# Patient Record
Sex: MALE | Race: WHITE | HISPANIC OR LATINO | Employment: FULL TIME | URBAN - METROPOLITAN AREA
[De-identification: names, ages, dates, MRNs, and addresses within clinical notes are randomized per-mention and may not be internally consistent; named-entity substitution may affect disease eponyms.]

---

## 2017-01-20 ENCOUNTER — APPOINTMENT (OUTPATIENT)
Dept: LAB | Age: 57
End: 2017-01-20
Attending: PREVENTIVE MEDICINE

## 2017-01-20 ENCOUNTER — HOSPITAL ENCOUNTER (OUTPATIENT)
Dept: RADIOLOGY | Age: 57
Discharge: HOME | End: 2017-01-20
Attending: PREVENTIVE MEDICINE

## 2017-01-20 ENCOUNTER — TRANSCRIBE ORDERS (OUTPATIENT)
Dept: URGENT CARE | Age: 57
End: 2017-01-20

## 2017-01-20 DIAGNOSIS — Z00.00 ROUTINE GENERAL MEDICAL EXAMINATION AT A HEALTH CARE FACILITY: Primary | ICD-10-CM

## 2017-01-20 DIAGNOSIS — Z00.00 ROUTINE GENERAL MEDICAL EXAMINATION AT A HEALTH CARE FACILITY: ICD-10-CM

## 2017-01-20 PROCEDURE — 71020 HB CHEST X-RAY 2VW FRONTAL&LATL: CPT

## 2017-01-20 PROCEDURE — 86870 RBC ANTIBODY IDENTIFICATION: CPT

## 2017-01-20 PROCEDURE — 84202 ASSAY RBC PROTOPORPHYRIN: CPT

## 2017-01-20 PROCEDURE — 36415 COLL VENOUS BLD VENIPUNCTURE: CPT

## 2017-01-25 LAB
FEP BLD-MCNC: 32 UG/DL (ref 0–100)
LEAD BLD-MCNC: 2 UG/DL (ref 0–19)
ZPP RBC-MCNC: 35 UG/DL (ref 0–100)

## 2017-12-19 ENCOUNTER — ALLSCRIPTS OFFICE VISIT (OUTPATIENT)
Dept: OTHER | Facility: OTHER | Age: 57
End: 2017-12-19

## 2017-12-20 ENCOUNTER — TRANSCRIBE ORDERS (OUTPATIENT)
Dept: ADMINISTRATIVE | Facility: HOSPITAL | Age: 57
End: 2017-12-20

## 2017-12-20 DIAGNOSIS — I44.7 LEFT BUNDLE-BRANCH BLOCK: ICD-10-CM

## 2017-12-20 DIAGNOSIS — Z00.00 ENCOUNTER FOR GENERAL ADULT MEDICAL EXAMINATION WITHOUT ABNORMAL FINDINGS: ICD-10-CM

## 2017-12-20 DIAGNOSIS — I10 ESSENTIAL HYPERTENSION, MALIGNANT: Primary | ICD-10-CM

## 2017-12-20 DIAGNOSIS — E78.00 PURE HYPERCHOLESTEROLEMIA: ICD-10-CM

## 2017-12-20 DIAGNOSIS — I10 ESSENTIAL (PRIMARY) HYPERTENSION: ICD-10-CM

## 2017-12-20 DIAGNOSIS — E78.5 HYPERLIPIDEMIA: ICD-10-CM

## 2017-12-20 NOTE — PROGRESS NOTES
Assessment  Assessed    1  Dyslipidemia (272 4) (E78 5)   2  Hypertension (401 9) (I10)   3  Left bundle branch block (426 3) (I44 7)    Plan  Health Maintenance    · Follow-up visit in 1 year Evaluation and Treatment  Follow-up  Status: Hold For -Scheduling  Requested for: 40DLF2448   Ordered; For: Health Maintenance; Ordered By: Kristin Ochoa Performed:  Due: 36QWP4598   · (1) [place]; Status:Active; Requested for:91Uhw8834;    Perform:[facility]; Due:23Wwa0455; Ordered;For:Health Maintenance; Ordered By:Yasmani Vargas;   · (1) LIPID PANEL FASTING W DIRECT LDL REFLEX; Status:Active; Requestedfor:[id number];    Perform:[facility]; Due:40Rhg5418; Ordered;For:Health Maintenance; Ordered By:Daniel Vargas; Hypercholesterolemia, Hyperlipidemia, Hypertension    · (1) COMPREHENSIVE METABOLIC PANEL; Status:Active - Retrospective By ProtocolAuthorization; Requested for:[id number];    Perform:[facility]; Order Comments:non fasting; Due:56Wfb4226; Last Updated By:Dacia Dwyer; 12/19/2017 5:36:24 PM;Ordered;For:Hypercholesterolemia, Hyperlipidemia, Hypertension; Ordered By:Daniel Vargas; Hypercholesterolemia, Hypertension, Left bundle branch block    · (1) CBC/ PLT (NO DIFF); Status:Active - Retrospective By Protocol Authorization; Requested for:[id number];    Perform:[address]; Order Comments:non fasting; Due:66Udg6728; Last Updated By:Dacia Dwyer; 12/19/2017 5:36:23 PM;Ordered;For:Hypercholesterolemia, Hypertension, Left bundle branch block; Ordered By:Daniel Vargas; Hyperlipidemia    · Simvastatin 40 MG Oral Tablet; Take one tablet daily   Rx By: Kristin Ochoa; Dispense: 90 Days ; #:90 X 90 Tablet Bottle;  Refill: 3;For: Hyperlipidemia; MAZIN = Y; Verified Transmission to [facility] ; Last Updated By: System, [employer]; 12/19/2017 5:50:14 PM  Hypertension    · AmLODIPine Besylate 10 MG Oral Tablet; take one tablet by mouth daily   Rx By: Kristin Ochoa; Dispense: 90 Days ; #:90 X 90 Tablet Bottle; Refill: 3;For: Hypertension; MAZIN = N; Verified Transmission to [address]; Last Updated By: System, [employer]; 12/19/2017 5:50:14 PM   · Labetalol HCl - 200 MG Oral Tablet; Take one tablet twice daily   Rx By: Kate Lu; Dispense: 90 Days ; #:180 X 90 Tablet Bottle; Refill: 3;For: Hypertension; MAZIN = N; Verified Transmission to [address]; Last Updated By: System, [employer]; 12/19/2017 5:50:14 PM  Left bundle branch block    · ECHO COMPLETE WITH CONTRAST IF INDICATED; Status:Need Information - FinancialAuthorization; Requested for:[id number];    Perform:[facility]; Due:35Jun7120; Ordered; For:Left bundle branch block; Ordered By:Sonal Vargas;   · EKG/ECG- POC; Status:Complete;   Done: 64OUN2398   Perform: In Office; Due:44Xlx5078; Last Updated Jen Pederson; 12/19/2017 4:53:40 PM;Ordered; For:Left bundle branch block; Ordered By:Yasmani Vargas;    Discussion/Summary  He has no symptoms  He does very vigorous work  He has been off medications for a number of weeks  EKG continues to exhibit left bundle with a wide QRS, noted at the time of his prior exam in 8/15, not present in 6/14  Because of his extensive traveling, he is been unable to undergo testing  He plans to remain in [facility] for the next 1-2 weeks  We will make every attempt to obtain a lipid panel and an echocardiogram in the coming days  He will return in one year  Chief Complaint  Chief Complaint Free Text Note Form: Patient is here for a followup  Patient denies cardiac complaints  History of Present Illness  Mr Berlin Galindo has a history of hypertension and dyslipidemia  His lipids are well controlled on statin therapy  His blood pressure is under reasonable control  A new left bundle branch block, with wide QRS (160 ms) was identified at the time of his visit in 8/15; this was not present on the tracing obtained in 6/14   An echocardiogram in 2008 disclosed normal systolic function  He has remained completely without symptoms, including dyspnea and chest discomfort, in spite of a very physically demanding occupation  Unfortunately, because of his travel schedule, appropriate testing and follow-up has not been possible  At the time of his visit in [id number], he was able to remain in the area for several days, and arrangements were made for lipid testing and an echocardiogram to assess the heart for structural abnormalities in the presence of a persistent new left bundle branch block  He is an expert in demolition, and is frequently out of the area  Arrangements were made for an evaluation to conform to his work schedule  Review of Systems  Cardiology Male ROS:    Cardiac: No complaints of chest pain, no palpitations, no fainiting  Skin: No complaints of nonhealing sores or skin rash  Genitourinary: No complaints of recurrent urinary tract infections, frequent urination at night, difficult urination, blood in urine, kidney stones, loss of bladder control, no kidney or prostate problems, no erectile dysfunction  Psychological: No complaints of feeling depressed, anxiety, panic attacks, or difficulty concentrating  General: No complaints of trouble sleeping, lack of energy, fatigue, appetite changes, weight changes, fever, frequent infections, or night sweats  Respiratory: No complaints of shortness of breath, cough with sputum, or wheezing  HEENT: No complaints of serious problems, hearing problems, nose problems, throat problems, or snoring  Gastrointestinal: No complaints of liver problems, nausea, vomiting, heartburn, constipation, bloody stools, diarrhea, problems swallowing, adbominal pain, or rectal bleeding  Hematologic: No complaints of bleeding disorders, anemia, blood clots, or excessive brusing    Neurological: No complaints of numbness, tingling, dizziness, weakness, seizures, headaches, syncope or fainting, AM fatigue, daytime sleepiness, no witnessed apnea episodes  Musculoskeletal: No complaints of arthritis, back pain, or painfull swelling  Active Problems  Problems    1  Dyslipidemia (272 4) (E78 5)   2  Hypercholesterolemia (272 0) (E78 00)   3  Hyperlipidemia (272 4) (E78 5)   4  Hypertension (401 9) (I10)   5  Left bundle branch block (426 3) (I44 7)    Past Medical History  Active Problems And Past Medical History Reviewed: The active problems and past medical history were reviewed and updated today  Surgical History  Surgical History Reviewed: The surgical history was reviewed and updated today  Family History  Family History Reviewed: The family history was reviewed and updated today  Social History  Problems    · Being A Social Drinker   · Never A Smoker  Social History Reviewed: The social history was reviewed and updated today  Current Meds   1  AmLODIPine Besylate 10 MG Oral Tablet; take one tablet by mouth daily; Therapy: 59ZUT3501 to (Evaluate:26Jun2017)  Requested for: 26Jun2017; Last Rx:58Sdp0861; Status: ACTIVE - Renewal Denied Ordered   2  Labetalol HCl - 200 MG Oral Tablet; Take one tablet twice daily; Therapy: 71DGK4428 to (Evaluate:22Sep2017)  Requested for: 78YHA8616; Last Rx:53Kes2760 Ordered   3  Simvastatin 40 MG Oral Tablet; Take one tablet daily; Therapy: 72RVP9856 to (Evaluate:22Sep2017)  Requested for: 41EAD9327; Last Rx:01Udo3942 Ordered  Medication List Reviewed: The medication list was reviewed and updated today  Allergies  Medication    1  No Known Drug Allergies    Vitals  Vital Signs    Recorded: 05LED0532 04:54PM   Heart Rate 83   Systolic 125, RUE, Sitting   Diastolic 70, RUE, Sitting   Height 5 ft 7 in   Weight 189 lb    BMI Calculated 29 6   BSA Calculated 1 97     Physical Exam   Constitutional  General appearance: No acute distress, well appearing and well nourished  Eyes  Conjunctiva and Sclera examination: Conjunctiva pink, sclera anicteric     Ears, Nose, Mouth, and Throat - Oropharynx: Clear, nares are clear, mucous membranes are moist   Neck  Neck and thyroid: Normal, supple, trachea midline, no thyromegaly  Pulmonary  Respiratory effort: No increased work of breathing or signs of respiratory distress  Auscultation of lungs: Clear to auscultation, no rales, no rhonchi, no wheezing, good air movement  Cardiovascular  Auscultation of heart: Normal rate and rhythm, normal S1 and S2, no murmurs  Carotid pulses: Normal, 2+ bilaterally  Peripheral vascular exam: Normal pulses throughout, no tenderness, erythema or swelling  Pedal pulses: Normal, 2+ bilaterally  Examination of extremities for edema and/or varicosities: Normal    Abdomen  Abdomen: Non-tender and no distention  Liver and spleen: No hepatomegaly or splenomegaly  Musculoskeletal Gait and station: Normal gait  -- Digits and nails: Normal without clubbing or cyanosis  -- Inspection/palpation of joints, bones, and muscles: Normal, ROM normal    Skin - Skin and subcutaneous tissue: Normal without rashes or lesions  Skin is warm and well perfused, normal turgor  Neurologic - Cranial nerves: II - XII intact    Psychiatric - Orientation to person, place, and time: Normal -- Mood and affect: Normal       Signatures   Electronically signed by : Divya Long MD; Dec 19 2017  5:55PM EST                       (Author)

## 2018-01-11 ENCOUNTER — APPOINTMENT (OUTPATIENT)
Dept: RADIOLOGY | Age: 58
End: 2018-01-11
Attending: PREVENTIVE MEDICINE

## 2018-01-11 ENCOUNTER — GENERIC CONVERSION - ENCOUNTER (OUTPATIENT)
Dept: OTHER | Facility: OTHER | Age: 58
End: 2018-01-11

## 2018-01-11 ENCOUNTER — APPOINTMENT (OUTPATIENT)
Dept: LAB | Age: 58
End: 2018-01-11
Attending: PREVENTIVE MEDICINE

## 2018-01-11 ENCOUNTER — TRANSCRIBE ORDERS (OUTPATIENT)
Dept: ADMINISTRATIVE | Age: 58
End: 2018-01-11

## 2018-01-11 DIAGNOSIS — Z02.89 ENCOUNTER FOR OCCUPATIONAL PHYSICAL EXAMINATION: ICD-10-CM

## 2018-01-11 DIAGNOSIS — Z02.89 ENCOUNTER FOR OCCUPATIONAL PHYSICAL EXAMINATION: Primary | ICD-10-CM

## 2018-01-11 PROCEDURE — 84202 ASSAY RBC PROTOPORPHYRIN: CPT | Performed by: PREVENTIVE MEDICINE

## 2018-01-11 PROCEDURE — 71046 X-RAY EXAM CHEST 2 VIEWS: CPT

## 2018-01-11 PROCEDURE — 36415 COLL VENOUS BLD VENIPUNCTURE: CPT | Performed by: PREVENTIVE MEDICINE

## 2018-01-11 PROCEDURE — 86870 RBC ANTIBODY IDENTIFICATION: CPT | Performed by: PREVENTIVE MEDICINE

## 2018-01-12 LAB
FEP BLD-MCNC: 32 UG/DL (ref 0–100)
LEAD BLD-MCNC: 3 UG/DL (ref 0–19)
ZPP RBC-MCNC: 35 UG/DL (ref 0–100)

## 2018-01-22 VITALS
HEART RATE: 83 BPM | SYSTOLIC BLOOD PRESSURE: 140 MMHG | BODY MASS INDEX: 29.66 KG/M2 | WEIGHT: 189 LBS | HEIGHT: 67 IN | DIASTOLIC BLOOD PRESSURE: 70 MMHG

## 2018-12-31 ENCOUNTER — APPOINTMENT (OUTPATIENT)
Dept: RADIOLOGY | Age: 58
End: 2018-12-31
Attending: PREVENTIVE MEDICINE

## 2018-12-31 ENCOUNTER — APPOINTMENT (OUTPATIENT)
Dept: LAB | Age: 58
End: 2018-12-31
Attending: PREVENTIVE MEDICINE

## 2018-12-31 ENCOUNTER — TRANSCRIBE ORDERS (OUTPATIENT)
Dept: ADMINISTRATIVE | Age: 58
End: 2018-12-31

## 2018-12-31 DIAGNOSIS — Z02.89 ENCOUNTER FOR PHYSICAL EXAMINATION RELATED TO EMPLOYMENT: Primary | ICD-10-CM

## 2018-12-31 DIAGNOSIS — Z02.89 ENCOUNTER FOR PHYSICAL EXAMINATION RELATED TO EMPLOYMENT: ICD-10-CM

## 2018-12-31 PROCEDURE — 84202 ASSAY RBC PROTOPORPHYRIN: CPT

## 2018-12-31 PROCEDURE — 36415 COLL VENOUS BLD VENIPUNCTURE: CPT

## 2018-12-31 PROCEDURE — 86870 RBC ANTIBODY IDENTIFICATION: CPT

## 2018-12-31 PROCEDURE — 71046 X-RAY EXAM CHEST 2 VIEWS: CPT

## 2019-01-03 LAB
FEP BLD-MCNC: 28 UG/DL (ref 0–100)
LEAD BLD-MCNC: 2 UG/DL (ref 0–4)
ZPP RBC-MCNC: 31 UG/DL (ref 0–100)

## 2019-02-03 DIAGNOSIS — I10 ESSENTIAL HYPERTENSION: ICD-10-CM

## 2019-02-03 DIAGNOSIS — E78.5 DYSLIPIDEMIA: Primary | ICD-10-CM

## 2019-02-04 RX ORDER — SIMVASTATIN 40 MG
TABLET ORAL
Qty: 90 TABLET | Refills: 3 | Status: SHIPPED | OUTPATIENT
Start: 2019-02-04 | End: 2020-03-18

## 2019-02-04 RX ORDER — AMLODIPINE BESYLATE 10 MG/1
TABLET ORAL
Qty: 90 TABLET | Refills: 3 | Status: SHIPPED | OUTPATIENT
Start: 2019-02-04

## 2019-02-04 RX ORDER — LABETALOL 200 MG/1
TABLET, FILM COATED ORAL
Qty: 180 TABLET | Refills: 3 | Status: SHIPPED | OUTPATIENT
Start: 2019-02-04

## 2020-03-17 DIAGNOSIS — E78.5 DYSLIPIDEMIA: ICD-10-CM

## 2020-03-18 RX ORDER — SIMVASTATIN 40 MG
TABLET ORAL
Qty: 90 TABLET | Refills: 3 | Status: SHIPPED | OUTPATIENT
Start: 2020-03-18

## 2022-04-15 ENCOUNTER — TELEPHONE (OUTPATIENT)
Dept: OTHER | Facility: OTHER | Age: 62
End: 2022-04-15

## 2022-04-15 NOTE — TELEPHONE ENCOUNTER
Patient called and is asking if the office can give him a call to schedule an new patent appointment